# Patient Record
Sex: MALE | Race: ASIAN | NOT HISPANIC OR LATINO
[De-identification: names, ages, dates, MRNs, and addresses within clinical notes are randomized per-mention and may not be internally consistent; named-entity substitution may affect disease eponyms.]

---

## 2020-02-11 ENCOUNTER — APPOINTMENT (OUTPATIENT)
Dept: UROLOGY | Facility: CLINIC | Age: 70
End: 2020-02-11
Payer: MEDICARE

## 2020-02-11 VITALS
TEMPERATURE: 97.3 F | SYSTOLIC BLOOD PRESSURE: 116 MMHG | WEIGHT: 146 LBS | HEIGHT: 65 IN | BODY MASS INDEX: 24.32 KG/M2 | HEART RATE: 53 BPM | OXYGEN SATURATION: 100 % | DIASTOLIC BLOOD PRESSURE: 69 MMHG | RESPIRATION RATE: 18 BRPM

## 2020-02-11 DIAGNOSIS — Z78.9 OTHER SPECIFIED HEALTH STATUS: ICD-10-CM

## 2020-02-11 DIAGNOSIS — Z87.891 PERSONAL HISTORY OF NICOTINE DEPENDENCE: ICD-10-CM

## 2020-02-11 PROCEDURE — 99204 OFFICE O/P NEW MOD 45 MIN: CPT

## 2020-02-11 NOTE — ADDENDUM
[FreeTextEntry1] : Entered by Ghanshyam Mccullough, acting as scribe for Dr. Jozef Bullard.\par \par The documentation recorded by the scribe accurately reflects the service I personally performed and the decisions made by me.

## 2020-02-11 NOTE — LETTER BODY
[FreeTextEntry1] : Ghanshyam Richards MD\par [address] [address] [place] [address] - [address]\[place]  [address]\par [phone]\par [phone]\par \par Dear Dr. Richards,\par \par Reason for visit: Microscopic hematuria.\par \par This is a 69 year-old Mandarin-speaking man with microscopic hematuria referred for evaluation. He is accompanied by his daughter today. His urinalysis demonstrated evidence of microscopic hematuria. He reports his previous urinalyses have also demonstrated RBC upon microscopy. He denies any gross hematuria, dysuria or urinary incontinence. The patient denies any aggravating or relieving factors. The patient denies any interference of function. The patient is entirely asymptomatic. All other review of systems are negative. He has no cancer in his family medical history. He has previously undergone bilateral hernia repair surgery. Past medical history, family history and social history were inquired and were noncontributory to current condition. The patient is a former smoker. He drinks socially. Medications and allergies were reviewed. He has no known allergies to medication. \par \par On examination, the patient is an elderly-appearing gentleman in no acute distress. He ambulates with a cane. He is alert and oriented follows commands. He has normal mood and affect. He is normocephalic. Neck is supple. Respirations are unlabored. His abdomen is soft and nontender. Bladder is nonpalpable. No CVA tenderness. Neurologically he is grossly intact. No peripheral edema. Skin without gross abnormality. He has normal male external genitalia. Normal meatus. Bilateral testes are descended intrascrotally and normal to palpation. On rectal examination, there is normal sphincter tone. The prostate is clinically benign without focal induration or nodularity.\par \par His urinalysis demonstrated evidence of microscopic hematuria.  \par \par Assessment: Microscopic hematuria.\par \par I counseled the patient on the various etiologies of microscopic hematuria. I discussed the risk of occult malignancy. I recommended patient obtain urine cytology, urinalysis, renal ultrasound, and cystoscopy. I counseled the patient about the procedures. I answered the patient's questions. The risks and expected outcomes were discussed. He will obtain BMP and PSA today to ensure stability. The patient will follow up as directed and contact me with any questions or concerns. Thank you for the opportunity to participate in the care of Mr. TRAVIS. I will keep you updated on his progress. \par \par Plan: BMP. PSA. Cystoscopy. Urinalysis. Cytology. Renal ultrasound. Follow up as directed.

## 2020-02-11 NOTE — PHYSICAL EXAM
[General Appearance - Well Nourished] : well nourished [Normal Appearance] : normal appearance [General Appearance - Well Developed] : well developed [Well Groomed] : well groomed [General Appearance - In No Acute Distress] : no acute distress [Edema] : no peripheral edema [Abdomen Soft] : soft [Respiration, Rhythm And Depth] : normal respiratory rhythm and effort [Exaggerated Use Of Accessory Muscles For Inspiration] : no accessory muscle use [Abdomen Tenderness] : non-tender [Urethral Meatus] : meatus normal [Urinary Bladder Findings] : the bladder was normal on palpation [Costovertebral Angle Tenderness] : no ~M costovertebral angle tenderness [Scrotum] : the scrotum was normal [Testes Mass (___cm)] : there were no testicular masses [No Prostate Nodules] : no prostate nodules [] : no rash [Normal Station and Gait] : the gait and station were normal for the patient's age [No Focal Deficits] : no focal deficits [Mood] : the mood was normal [Oriented To Time, Place, And Person] : oriented to person, place, and time [Affect] : the affect was normal [Not Anxious] : not anxious [No Palpable Adenopathy] : no palpable adenopathy

## 2020-02-17 LAB
ANION GAP SERPL CALC-SCNC: 12 MMOL/L
APPEARANCE: CLEAR
BACTERIA: NEGATIVE
BILIRUBIN URINE: NEGATIVE
BLOOD URINE: NORMAL
BUN SERPL-MCNC: 18 MG/DL
CALCIUM SERPL-MCNC: 9.3 MG/DL
CHLORIDE SERPL-SCNC: 107 MMOL/L
CO2 SERPL-SCNC: 23 MMOL/L
COLOR: COLORLESS
CREAT SERPL-MCNC: 1.55 MG/DL
GLUCOSE QUALITATIVE U: NEGATIVE
GLUCOSE SERPL-MCNC: 85 MG/DL
HYALINE CASTS: 0 /LPF
KETONES URINE: NEGATIVE
LEUKOCYTE ESTERASE URINE: NEGATIVE
MICROSCOPIC-UA: NORMAL
NITRITE URINE: NEGATIVE
PH URINE: 6.5
POTASSIUM SERPL-SCNC: 4.1 MMOL/L
PROTEIN URINE: ABNORMAL
PSA FREE FLD-MCNC: 47 %
PSA FREE SERPL-MCNC: 0.72 NG/ML
PSA SERPL-MCNC: 1.53 NG/ML
RED BLOOD CELLS URINE: 0 /HPF
SODIUM SERPL-SCNC: 142 MMOL/L
SPECIFIC GRAVITY URINE: 1
SQUAMOUS EPITHELIAL CELLS: 0 /HPF
URINE CYTOLOGY: NORMAL
UROBILINOGEN URINE: NORMAL
WHITE BLOOD CELLS URINE: 0 /HPF

## 2020-02-25 ENCOUNTER — APPOINTMENT (OUTPATIENT)
Dept: UROLOGY | Facility: CLINIC | Age: 70
End: 2020-02-25
Payer: MEDICARE

## 2020-02-25 VITALS
RESPIRATION RATE: 17 BRPM | BODY MASS INDEX: 23.63 KG/M2 | HEART RATE: 50 BPM | SYSTOLIC BLOOD PRESSURE: 113 MMHG | WEIGHT: 142 LBS | DIASTOLIC BLOOD PRESSURE: 64 MMHG | TEMPERATURE: 97.4 F | OXYGEN SATURATION: 100 %

## 2020-02-25 DIAGNOSIS — R31.21 ASYMPTOMATIC MICROSCOPIC HEMATURIA: ICD-10-CM

## 2020-02-25 DIAGNOSIS — Z00.00 ENCOUNTER FOR GENERAL ADULT MEDICAL EXAMINATION W/OUT ABNORMAL FINDINGS: ICD-10-CM

## 2020-02-25 PROCEDURE — 99213 OFFICE O/P EST LOW 20 MIN: CPT | Mod: 25

## 2020-02-25 PROCEDURE — 52000 CYSTOURETHROSCOPY: CPT

## 2020-02-25 PROCEDURE — 76775 US EXAM ABDO BACK WALL LIM: CPT

## 2020-02-25 RX ORDER — CIPROFLOXACIN HYDROCHLORIDE 500 MG/1
500 TABLET, FILM COATED ORAL
Refills: 0 | Status: COMPLETED | OUTPATIENT
Start: 2020-02-25

## 2020-02-25 RX ORDER — CIPROFLOXACIN HYDROCHLORIDE 500 MG/1
500 TABLET, FILM COATED ORAL
Qty: 2 | Refills: 0 | Status: ACTIVE | COMMUNITY
Start: 2020-02-25

## 2020-02-26 NOTE — ADDENDUM
[FreeTextEntry1] : Entered by Ho Abreu, acting as scribe for Dr. Jozef Bullard.\par \par The documentation recorded by the scribe accurately reflects the service I personally performed and the decisions made by me.

## 2020-02-26 NOTE — LETTER BODY
[FreeTextEntry1] : Ghanshyam Richards MD\par [id number] [address] [place] [address] - [address]\[place] [address]\par [phone]\par [phone]\par \par Dear Dr. Richards,\par \par Reason for visit: Microscopic hematuria.\par \par This is a 69 year-old Mandarin-speaking man with microscopic hematuria. Patient returns today for ultrasound and cystoscopy. Since his last visit, patient's urinalysis demonstrated evidence of proteinuria. He reports seeing nephrology for his proteinuriaHe is otherwise well and denies interval changes in health. He denies any gross hematuria, dysuria or urinary incontinence. The patient denies any interference of function. The patient is entirely asymptomatic. All other review of systems are negative. Past medical history, family history and social history were unchanged. Medications and allergies were reviewed. He has no known allergies to medication. \par \par On examination, the patient is an elderly-appearing gentleman in no acute distress. He ambulates with a cane. He is alert and oriented follows commands. He has normal mood and affect. He is normocephalic. Neck is supple. Respirations are unlabored. His abdomen is soft and nontender. Bladder is nonpalpable. No CVA tenderness. Neurologically he is grossly intact. No peripheral edema. Skin without gross abnormality. He has normal male external genitalia. Normal meatus. Bilateral testes are descended intrascrotally and normal to palpation. On rectal examination, there is normal sphincter tone. The prostate is clinically benign without focal induration or nodularity.\par \par I personally reviewed ultrasound images with the patient today. Images demonstrated that both kidneys appear normal in size and echogenicity. No stones, solid masses or hydronephrosis visualized.\par Cystoscopy today was unremarkable.\par \par Assessment: Microscopic hematuria. Proteinuria.\par \par I counseled the patient. His ultrasound and cystoscopy were unremarkable. His repeat urinalysis was unremarkable for red blood cells as well. In terms of his proteinuria, I recommend he maintain follow up with his nephrologist to ensure stability. Patient understands that if he develops gross hematuria or any urinary discomfort, he will contact me for further evaluation. The patient will follow up as directed and contact me with any questions or concerns. Thank you for the opportunity to participate in the care of Mr. TRAVIS. I will keep you updated on his progress. \par \par Plan: See Nephrology for proteinuria. Follow up as directed.

## 2020-02-27 LAB — URINE CYTOLOGY: NORMAL

## 2020-08-25 ENCOUNTER — APPOINTMENT (OUTPATIENT)
Dept: UROLOGY | Facility: CLINIC | Age: 70
End: 2020-08-25